# Patient Record
Sex: MALE | Race: BLACK OR AFRICAN AMERICAN | NOT HISPANIC OR LATINO | Employment: OTHER | URBAN - METROPOLITAN AREA
[De-identification: names, ages, dates, MRNs, and addresses within clinical notes are randomized per-mention and may not be internally consistent; named-entity substitution may affect disease eponyms.]

---

## 2018-10-04 ENCOUNTER — OFFICE VISIT (OUTPATIENT)
Dept: FAMILY MEDICINE | Facility: HOSPITAL | Age: 63
End: 2018-10-04
Attending: FAMILY MEDICINE
Payer: MEDICAID

## 2018-10-04 VITALS
DIASTOLIC BLOOD PRESSURE: 71 MMHG | BODY MASS INDEX: 21.19 KG/M2 | WEIGHT: 124.13 LBS | HEART RATE: 74 BPM | HEIGHT: 64 IN | SYSTOLIC BLOOD PRESSURE: 106 MMHG

## 2018-10-04 DIAGNOSIS — G89.29 CHRONIC PAIN OF BOTH SHOULDERS: Primary | ICD-10-CM

## 2018-10-04 DIAGNOSIS — M25.511 CHRONIC PAIN OF BOTH SHOULDERS: Primary | ICD-10-CM

## 2018-10-04 DIAGNOSIS — M25.512 CHRONIC PAIN OF BOTH SHOULDERS: Primary | ICD-10-CM

## 2018-10-04 DIAGNOSIS — R29.898 SHOULDER WEAKNESS: ICD-10-CM

## 2018-10-04 DIAGNOSIS — Z23 NEED FOR PROPHYLACTIC VACCINATION AND INOCULATION AGAINST INFLUENZA: Primary | ICD-10-CM

## 2018-10-04 DIAGNOSIS — G62.9 NEUROPATHY: ICD-10-CM

## 2018-10-04 PROCEDURE — 90686 IIV4 VACC NO PRSV 0.5 ML IM: CPT

## 2018-10-04 PROCEDURE — 99204 OFFICE O/P NEW MOD 45 MIN: CPT | Performed by: STUDENT IN AN ORGANIZED HEALTH CARE EDUCATION/TRAINING PROGRAM
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
## 2020-07-17 ENCOUNTER — HOSPITAL ENCOUNTER (EMERGENCY)
Facility: HOSPITAL | Age: 65
Discharge: HOME | End: 2020-07-17
Attending: EMERGENCY MEDICINE
Payer: MEDICAID

## 2020-07-17 VITALS
WEIGHT: 120 LBS | TEMPERATURE: 99 F | HEIGHT: 64 IN | DIASTOLIC BLOOD PRESSURE: 86 MMHG | BODY MASS INDEX: 20.49 KG/M2 | OXYGEN SATURATION: 97 % | SYSTOLIC BLOOD PRESSURE: 126 MMHG | RESPIRATION RATE: 20 BRPM | HEART RATE: 94 BPM

## 2020-07-17 DIAGNOSIS — Z48.02: Primary | ICD-10-CM

## 2020-07-17 PROCEDURE — 99282 EMERGENCY DEPT VISIT SF MDM: CPT

## 2020-07-17 NOTE — ED NOTES
SHARIF Duffy at bedside to remove staples. Pt's had 4 staples placed in March and is now wanting to have them removed. Pt is AAOx4, stable, and without acute distress at this time.     APPEARANCE: Alert, oriented and in no acute distress.  CARDIAC: Normal rate and rhythm.  PERIPHERAL VASCULAR: peripheral pulses present. Normal cap refill. No edema. Warm to touch.    RESPIRATORY:Normal rate and effort. Respirations are equal and unlabored no obvious signs of distress.  GASTRO: soft, no tenderness, no abdominal distention.  MUSC: Full ROM. No bony tenderness or soft tissue tenderness. No obvious deformity.  SKIN: Skin is warm and dry, normal skin turgor, mucous membranes moist. Pt has 4 staples noted to L frontal side of head. No signs of infection present.  NEURO: 5/5 strength major flexors/extensors bilaterally. Sensory intact to light touch bilaterally. [place] coma scale: eyes open spontaneously-4, oriented & converses-5, obeys commands-6. No neurological abnormalities.   MENTAL STATUS: awake, alert and aware of environment.

## 2020-07-17 NOTE — ED PROVIDER NOTES
Encounter Date: 7/17/2020       History     Chief Complaint   Patient presents with    Suture / Staple Removal     pt is returning to have staples removed from his head. staples were placed in March.      Quan Gurrola, a 65 y.o. male .[facility]     He presents to the ED evaluation of staple removal to left side of scalp that was placed in March.  Patient states he was reluctant to come to hospital or doctor's office because of the COVID pain to make.  He has no complaints at this time.          Review of patient's allergies indicates:  No Known Allergies  No past medical history on file.  No past surgical history on file.  No family history on file.  Social History     Tobacco Use    Smoking status: Current Every Day Smoker     Packs/day: 0.50     Types: Cigarettes   Substance Use Topics    Alcohol use: No     Frequency: Never     Binge frequency: Never    Drug use: Not on file     Review of Systems   Constitutional: Negative for fever.   Gastrointestinal: Negative for nausea and vomiting.   Musculoskeletal: Negative for neck pain and neck stiffness.   Skin: Positive for wound (healed laceration to left scalp ). Negative for color change.   Allergic/Immunologic: Negative for immunocompromised state.   Neurological: Negative for dizziness, weakness and numbness.   All other systems reviewed and are negative.      Physical Exam     Initial Vitals [07/17/20 1722]   BP Pulse Resp Temp SpO2   126/86 94 20 99.1 °F (37.3 °C) 97 %      MAP       --         Physical Exam    Nursing note and vitals reviewed.  Constitutional: He appears well-developed and well-nourished.   HENT:   Head: Normocephalic and atraumatic.       Right Ear: External ear normal.   Left Ear: External ear normal.   Nose: Nose normal.   Healed laceration to left scalp, 4 staples in place.   Eyes: EOM are normal.   Neck: Normal range of motion. Neck supple.   Cardiovascular: Normal rate and regular rhythm.   Pulmonary/Chest: No respiratory distress.    Musculoskeletal: Normal range of motion. No tenderness or edema.   Neurological: He is alert and oriented to person, place, and time.   Skin: Skin is warm and dry. Capillary refill takes less than 2 seconds.   Psychiatric: He has a normal mood and affect. Thought content normal.         ED Course   Suture Removal    Date/Time: 7/17/2020 6:12 PM  Location procedure was performed: [facility]  Performed by: Tati Berumen PA-C  Authorized by: Power Tamez MD   Body area: head/neck  Location details: scalp  Wound Appearance: clean, well healed, normal color, nontender and no drainage  Staples Removed: 4  Post-removal: no dressing applied  Complications: No  Specimens: No  Implants: No  Patient tolerance: Patient tolerated the procedure well with no immediate complications        Labs Reviewed - No data to display       Imaging Results    None          Medical Decision Making:   Initial Assessment:   Visit for staple removal  ED Management:  Patient presents to the ER for evaluation of staple removal.  Wound is well healed with no erythema or drainage.  Four staples were removed with no consequence to the patient.  No further follow-up needed.                                 Clinical Impression:       ICD-10-CM ICD-9-CM   1. Encounter for removal of staples  Z48.02 V58.32             ED Disposition Condition    Discharge Stable        ED Prescriptions     None        Follow-up Information    None                                    Tati Berumen PA-C  07/17/20 1811

## 2022-02-23 ENCOUNTER — TELEPHONE (OUTPATIENT)
Dept: HEMATOLOGY/ONCOLOGY | Facility: CLINIC | Age: 67
End: 2022-02-23
Payer: MEDICAID

## 2022-02-23 NOTE — TELEPHONE ENCOUNTER
----- Message from Iman Her sent at 2/23/2022  2:06 PM CST -----  Please see new referral in Media for bentley simpson

## 2023-07-14 ENCOUNTER — TELEPHONE (OUTPATIENT)
Dept: PULMONOLOGY | Facility: CLINIC | Age: 68
End: 2023-07-14
Payer: MEDICARE

## 2023-07-14 NOTE — TELEPHONE ENCOUNTER
----- Message from Anuja Simeon sent at 7/14/2023 11:01 AM CDT -----  Regarding: appt  Contact: [phone]  Pt requesting a appointment for the following possible lung cancer mass  ...Please call and adv @ [phone]

## 2023-07-14 NOTE — TELEPHONE ENCOUNTER
I called Mr Gurrola and asked does he have a ct disc copy from [facility] that was done 7-12-23?. He does not and I told him he will have to get a copy to bring to his appointment. He said he does not have time today to do this. I asked if I should call him Monday and see if he has it and he agreed. Ying Kaur

## 2023-08-04 ENCOUNTER — TELEPHONE (OUTPATIENT)
Dept: HEMATOLOGY/ONCOLOGY | Facility: CLINIC | Age: 68
End: 2023-08-04
Payer: MEDICARE

## 2023-08-04 NOTE — TELEPHONE ENCOUNTER
Referral received.  Attempted to contact Mr. Gurrola.  Left a detailed message and included my call back information. Will try again.

## 2023-08-08 ENCOUNTER — TELEPHONE (OUTPATIENT)
Dept: PULMONOLOGY | Facility: CLINIC | Age: 68
End: 2023-08-08
Payer: MEDICARE

## 2023-08-08 NOTE — TELEPHONE ENCOUNTER
Unable to leave message for sister @ [phone]. Unable to contact patient @ [phone], called home number and spoke with pt, number left for pt to contact Navigator to schedule appointment.  Patient not sure where imaging was done.  Called sister number again and spoke with her concerning pulmonary appointment.  Sister was able to tell where the imaging was done, and their availability for transportation.  Informed that appointment may be next Wednesday if we get images before the date of 8/16.  Verbalized understanding.   Called [facility] spoke with , imaging done at diagnostic imaging service in [place].  Information given to Thoracic Navigator (Christine Valencia) to assist getting images.

## 2023-08-10 ENCOUNTER — TELEPHONE (OUTPATIENT)
Dept: PULMONOLOGY | Facility: CLINIC | Age: 68
End: 2023-08-10
Payer: MEDICARE

## 2023-08-10 NOTE — TELEPHONE ENCOUNTER
Called and spoke with pt's sister as indicated on chart, confirmed scheduling of pulmonary appointment with Dr. Browne on Wednesday August 16th.  Instructions given on where to go with verbalized understanding.  Images requested through Oncology access navigator Bessy Her.

## 2023-08-15 ENCOUNTER — TELEPHONE (OUTPATIENT)
Dept: PULMONOLOGY | Facility: CLINIC | Age: 68
End: 2023-08-15
Payer: MEDICARE

## 2023-08-15 NOTE — TELEPHONE ENCOUNTER
Called and spoke with sister to confirm re-scheduling of pulmonary appointment from tomorrow 8/16 to Monday 8/218.  Verbalized understanding.

## 2023-08-28 ENCOUNTER — TELEPHONE (OUTPATIENT)
Dept: PULMONOLOGY | Facility: CLINIC | Age: 68
End: 2023-08-28
Payer: MEDICARE

## 2023-08-28 NOTE — TELEPHONE ENCOUNTER
Left message on pt voicemail, informing him that I have received his message. I advised pt that if he has any questions or concerns, he may contact the office.

## 2023-08-28 NOTE — TELEPHONE ENCOUNTER
Left message on pt voicemail, informing him that I have received his message. I also advised pt that if he has any questions or concerns, he may contact the office. Office number has been provided.

## 2023-08-28 NOTE — TELEPHONE ENCOUNTER
----- Message from Ladonna Ribera sent at 8/28/2023 12:37 PM CDT -----  Regarding: Reschedule Appt  Contact: Pt @ [phone] or [phone]  Pt is calling to get rescheduled, asking for a call back
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
## 2023-08-31 ENCOUNTER — TELEPHONE (OUTPATIENT)
Dept: PULMONOLOGY | Facility: CLINIC | Age: 68
End: 2023-08-31
Payer: MEDICARE

## 2023-08-31 NOTE — TELEPHONE ENCOUNTER
Called and spoke with pt to reschedule No Show appointment on 8/28, pt states that until they get transportation he can't make the appointment.  Stressed the importance of following up on his CT scan.  Verbalized understanding and nurse offered to call again to reschedule. Additional message left on  [phone].

## 2023-09-15 ENCOUNTER — TELEPHONE (OUTPATIENT)
Dept: PULMONOLOGY | Facility: CLINIC | Age: 68
End: 2023-09-15
Payer: MEDICARE

## 2023-09-15 NOTE — TELEPHONE ENCOUNTER
----- Message from Ladonna Ribera sent at 9/15/2023 10:01 AM CDT -----  Regarding: Appt  Contact: Pt @ [phone]  Pt is calling to get appt. Asking for a call back

## 2023-09-27 ENCOUNTER — OFFICE VISIT (OUTPATIENT)
Dept: PULMONOLOGY | Facility: CLINIC | Age: 68
End: 2023-09-27
Payer: MEDICARE

## 2023-09-27 VITALS
SYSTOLIC BLOOD PRESSURE: 109 MMHG | HEART RATE: 106 BPM | OXYGEN SATURATION: 93 % | HEIGHT: 64 IN | WEIGHT: 115.06 LBS | DIASTOLIC BLOOD PRESSURE: 62 MMHG | BODY MASS INDEX: 19.64 KG/M2

## 2023-09-27 DIAGNOSIS — R91.1 SOLITARY PULMONARY NODULE: Primary | ICD-10-CM

## 2023-09-27 PROCEDURE — 99999 PR PBB SHADOW E&M-EST. PATIENT-LVL IV: CPT | Mod: PBBFAC,,, | Performed by: EMERGENCY MEDICINE

## 2023-09-27 PROCEDURE — 3074F PR MOST RECENT SYSTOLIC BLOOD PRESSURE < 130 MM HG: ICD-10-PCS | Mod: CPTII,S$GLB,, | Performed by: EMERGENCY MEDICINE

## 2023-09-27 PROCEDURE — 3008F PR BODY MASS INDEX (BMI) DOCUMENTED: ICD-10-PCS | Mod: CPTII,S$GLB,, | Performed by: EMERGENCY MEDICINE

## 2023-09-27 PROCEDURE — 1101F PT FALLS ASSESS-DOCD LE1/YR: CPT | Mod: CPTII,S$GLB,, | Performed by: EMERGENCY MEDICINE

## 2023-09-27 PROCEDURE — 1126F AMNT PAIN NOTED NONE PRSNT: CPT | Mod: CPTII,S$GLB,, | Performed by: EMERGENCY MEDICINE

## 2023-09-27 PROCEDURE — 99999 PR PBB SHADOW E&M-EST. PATIENT-LVL IV: ICD-10-PCS | Mod: PBBFAC,,, | Performed by: EMERGENCY MEDICINE

## 2023-09-27 PROCEDURE — 99204 PR OFFICE/OUTPT VISIT, NEW, LEVL IV, 45-59 MIN: ICD-10-PCS | Mod: S$GLB,,, | Performed by: EMERGENCY MEDICINE

## 2023-09-27 PROCEDURE — 3288F PR FALLS RISK ASSESSMENT DOCUMENTED: ICD-10-PCS | Mod: CPTII,S$GLB,, | Performed by: EMERGENCY MEDICINE

## 2023-09-27 PROCEDURE — 3074F SYST BP LT 130 MM HG: CPT | Mod: CPTII,S$GLB,, | Performed by: EMERGENCY MEDICINE

## 2023-09-27 PROCEDURE — 1126F PR PAIN SEVERITY QUANTIFIED, NO PAIN PRESENT: ICD-10-PCS | Mod: CPTII,S$GLB,, | Performed by: EMERGENCY MEDICINE

## 2023-09-27 PROCEDURE — 3008F BODY MASS INDEX DOCD: CPT | Mod: CPTII,S$GLB,, | Performed by: EMERGENCY MEDICINE

## 2023-09-27 PROCEDURE — 1159F PR MEDICATION LIST DOCUMENTED IN MEDICAL RECORD: ICD-10-PCS | Mod: CPTII,S$GLB,, | Performed by: EMERGENCY MEDICINE

## 2023-09-27 PROCEDURE — 3078F DIAST BP <80 MM HG: CPT | Mod: CPTII,S$GLB,, | Performed by: EMERGENCY MEDICINE

## 2023-09-27 PROCEDURE — 99204 OFFICE O/P NEW MOD 45 MIN: CPT | Mod: S$GLB,,, | Performed by: EMERGENCY MEDICINE

## 2023-09-27 PROCEDURE — 1160F RVW MEDS BY RX/DR IN RCRD: CPT | Mod: CPTII,S$GLB,, | Performed by: EMERGENCY MEDICINE

## 2023-09-27 PROCEDURE — 1101F PR PT FALLS ASSESS DOC 0-1 FALLS W/OUT INJ PAST YR: ICD-10-PCS | Mod: CPTII,S$GLB,, | Performed by: EMERGENCY MEDICINE

## 2023-09-27 PROCEDURE — 3288F FALL RISK ASSESSMENT DOCD: CPT | Mod: CPTII,S$GLB,, | Performed by: EMERGENCY MEDICINE

## 2023-09-27 PROCEDURE — 3078F PR MOST RECENT DIASTOLIC BLOOD PRESSURE < 80 MM HG: ICD-10-PCS | Mod: CPTII,S$GLB,, | Performed by: EMERGENCY MEDICINE

## 2023-09-27 PROCEDURE — 1160F PR REVIEW ALL MEDS BY PRESCRIBER/CLIN PHARMACIST DOCUMENTED: ICD-10-PCS | Mod: CPTII,S$GLB,, | Performed by: EMERGENCY MEDICINE

## 2023-09-27 PROCEDURE — 1159F MED LIST DOCD IN RCRD: CPT | Mod: CPTII,S$GLB,, | Performed by: EMERGENCY MEDICINE

## 2023-09-27 RX ORDER — ALBUTEROL SULFATE 90 UG/1
AEROSOL, METERED RESPIRATORY (INHALATION)
COMMUNITY
Start: 2023-07-14

## 2023-09-27 RX ORDER — LACTULOSE 10 G/15ML
SOLUTION ORAL; RECTAL
COMMUNITY
Start: 2023-04-28

## 2023-09-27 RX ORDER — LOVASTATIN 10 MG/1
10 TABLET ORAL
COMMUNITY
Start: 2023-07-14

## 2023-09-27 NOTE — PROGRESS NOTES
"Pulmonary & Critical Care Medicine   Consultation Note    Reason for Consultation:    HPI: 69 y/o male with history as below. Had cough, CXr per primary MD at [facility]- Followed with CT Chest and found to have RUL opacity.      PmHx COPD, HLD  Dyspnea up and down steps. Improves with inhaler..   No edema.   No F/C/NS or additional symptoms.   Weight stable.   Unsure of inhaler therapy- Record review looks like albuterol.   Retired.   Denies HF/CVA/CAD   No echo on file. Nothing in care everywhere.     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 [place]. Same home 4 years. No renovations/construction..   Exposure to Animals/Pets: None   Travel History: None   History of exposures to TB: None   Family History of Lung Cancer: None   Childhood history of Lung Disease:None   Chest surgery/Trauma- None   OAC/Anti PLT- None   Tobacco history- Still off and on with smokes.. Started smoking age 4.. 1 pk/day   Anesthesia History- Never had.       Social History:   Social History     Socioeconomic History    Marital status: Single   Tobacco Use    Smoking status: Every Day     Current packs/day: 0.50     Types: Cigarettes   Substance and Sexual Activity    Alcohol use: No     No family history on file.  Drug Allergies:   Review of patient's allergies indicates:  No Known Allergies      Review of Systems:   A comprehensive 12-point review of systems was performed, and is negative except for those items mentioned above in the HPI section of this note.     Vital Signs:  /62 (BP Location: Left arm, Patient Position: Sitting, BP Method: Medium (Manual))   Pulse 106   Ht 5' 4" (1.626 m)   Wt 52.2 kg (115 lb 1.3 oz)   SpO2 (!) 93%   BMI 19.75 kg/m²      Physical Exam:   GEN- NAD AAOx3 Well Built, Well Appearing   HEENT- ATNC, PERRLA, EOMI, OP-Cl. No JVD, LAD or bruit noted. Trachea " Midline.   CV- RRR No M/R/G  RESP- CTA-Bilateral   GI- S/NT/ND. Positive BS X 4. No HSM Noted  BACK- Spine midline. No step off, crepitus or deformity noted. No midline TTP.   Ext- MAEW, No deformity. No edema or rashes noted.   Neuro- Strength 5/5 symmetric. CN 2-12 intact. Normal gait. Normal sensation.      Personal Review and Summary of Prior Diagnostics    Laboratory Studies: Reviewed   No Labs in system since 2020.     Microbiology Data: Reviewed     Summary of Chest Imaging Personally Reviewed:   CT [facility]-   RUL Nodule with band like scaring.   RLL sub centimeter nodule- Adjacent pleural thickening.   No Old imaging for comparison           2D Echo: None     PFT's: None          Assessment:     No diagnosis found.     No outpatient encounter medications on file as of 9/27/2023.     No facility-administered encounter medications on file as of 9/27/2023.     No orders of the defined types were placed in this encounter.      Plan:     Pulmonary nodule- Spiculated RUL + sub centimeter pleural based nodule on right with adjacent pleural thickening. No old imaging for comparison.   Tobacco use- Continued use. Discussed cessation. Has cut back considerably. Started smoking at age 4!   COPD- Suspected. On prn albuterol. Possible controller therapy but unsure. Reports PFTs remotely.. Unable to view.       -- Plan for robotic bronch + EBUS- Case request placed 10/27   -- Check NM PET   -- Needs repeat CT for planning. Ordered   -- Walk + PFTs   -- Maintain prn albuterol   -- No OAC/Anti_PLT   -- Smoking cessation as above.     Discussed procedure in detail. Risks including bleeding, respiratory failure, PTX and numerous additional potential complication up to death outlined. He verbalized understanding and all questions answered to his satisfaction. Written consent signed and on chart.       Rodrigo Navas MD   [facility]

## 2023-10-25 ENCOUNTER — HOSPITAL ENCOUNTER (OUTPATIENT)
Dept: PULMONOLOGY | Facility: CLINIC | Age: 68
Discharge: HOME | End: 2023-10-25
Payer: MEDICARE

## 2023-10-25 ENCOUNTER — HOSPITAL ENCOUNTER (OUTPATIENT)
Dept: RADIOLOGY | Facility: HOSPITAL | Age: 68
Discharge: HOME | End: 2023-10-25
Attending: EMERGENCY MEDICINE
Payer: MEDICARE

## 2023-10-25 VITALS — HEIGHT: 66 IN | WEIGHT: 114.19 LBS | BODY MASS INDEX: 18.35 KG/M2

## 2023-10-25 DIAGNOSIS — R91.1 SOLITARY PULMONARY NODULE: ICD-10-CM

## 2023-10-25 LAB
DLCO SINGLE BREATH LLN: 17.92
DLCO SINGLE BREATH PRE REF: 33.9 %
DLCO SINGLE BREATH REF: 24.85
DLCOC SBVA LLN: 2.55
DLCOC SBVA REF: 3.81
DLCOC SINGLE BREATH LLN: 17.92
DLCOC SINGLE BREATH REF: 24.85
DLCOCSBVAULN: 5.07
DLCOCSINGLEBREATHULN: 31.78
DLCOSINGLEBREATHULN: 31.78
DLCOVA LLN: 2.55
DLCOVA PRE REF: 51.6 %
DLCOVA PRE: 1.97 ML/(MIN*MMHG*L) (ref 2.55–5.07)
DLCOVA REF: 3.81
DLCOVAULN: 5.07
ERV LLN: -16449
ERV PRE REF: 108.5 %
ERV REF: 1
ERVULN: ABNORMAL
FEF 25 75 LLN: 0.72
FEF 25 75 PRE REF: 32.3 %
FEF 25 75 REF: 1.99
FET100 CHG: 21.4 %
FEV05 LLN: 1.24
FEV05 REF: 2.38
FEV1 CHG: -6.3 %
FEV1 FVC LLN: 64
FEV1 FVC PRE REF: 73.5 %
FEV1 FVC REF: 77
FEV1 LLN: 1.72
FEV1 PRE REF: 58.9 %
FEV1 REF: 2.5
FEV1 VOL CHG: -0.09
FRCPLETH LLN: 2.53
FRCPLETH PREREF: 98.9 %
FRCPLETH REF: 3.51
FRCPLETHULN: 4.5
FVC CHG: -3.3 %
FVC LLN: 2.34
FVC PRE REF: 80.1 %
FVC REF: 3.24
FVC VOL CHG: -0.09
IVC PRE: 2.53 L (ref 2.34–4.16)
IVC SINGLE BREATH LLN: 2.34
IVC SINGLE BREATH PRE REF: 78 %
IVC SINGLE BREATH REF: 3.24
IVCSINGLEBREATHULN: 4.16
LLN IC: -9999997.2
PEF LLN: 4.89
PEF PRE REF: 51.3 %
PEF REF: 7.33
PHYSICIAN COMMENT: ABNORMAL
POCT GLUCOSE: 87 MG/DL (ref 70–110)
POST FEF 25 75: 0.55 L/S (ref 0.72–3.9)
POST FET 100: 8.19 SEC
POST FEV1 FVC: 54.98 % (ref 64.17–88.72)
POST FEV1: 1.38 L (ref 1.72–3.22)
POST FEV5: 0.99 L (ref 1.24–3.51)
POST FVC: 2.51 L (ref 2.34–4.16)
POST PEF: 2.91 L/S (ref 4.89–9.76)
PRE DLCO: 8.41 ML/(MIN*MMHG) (ref 17.92–31.78)
PRE ERV: 1.08 L (ref -16449–16451)
PRE FEF 25 75: 0.64 L/S (ref 0.72–3.9)
PRE FET 100: 6.74 SEC
PRE FEV05 REF: 43.4 %
PRE FEV1 FVC: 56.72 % (ref 64.17–88.72)
PRE FEV1: 1.47 L (ref 1.72–3.22)
PRE FEV5: 1.03 L (ref 1.24–3.51)
PRE FRC PL: 3.47 L (ref 2.53–4.5)
PRE FVC: 2.6 L (ref 2.34–4.16)
PRE IC: 1.52 L (ref -9999997.2–#######.####)
PRE PEF: 3.76 L/S (ref 4.89–9.76)
PRE REF IC: 54.2 %
PRE RV: 2.39 L (ref 1.84–3.19)
PRE TLC: 4.99 L (ref 5.37–7.67)
RAW PRE REF: 94.8 %
RAW PRE: 2.9 CMH2O*S/L (ref 3.06–3.06)
RAW REF: 3.06
REF IC: 2.8
RV LLN: 1.84
RV PRE REF: 95 %
RV REF: 2.52
RVTLC LLN: 32
RVTLC PRE REF: 117.3 %
RVTLC PRE: 47.92 % (ref 31.89–49.85)
RVTLC REF: 41
RVTLCULN: 50
RVULN: 3.19
SGAW PRE REF: 108.2 %
SGAW PRE: 0.09 1/(CMH2O*S) (ref 0.08–0.08)
SGAW REF: 0.08
TLC LLN: 5.37
TLC PRE REF: 76.6 %
TLC REF: 6.52
TLC ULN: 7.67
ULN IC: ABNORMAL
VA PRE: 4.27 L (ref 6.37–6.37)
VA SINGLE BREATH LLN: 6.37
VA SINGLE BREATH PRE REF: 67.1 %
VA SINGLE BREATH REF: 6.37
VASINGLEBREATHULN: 6.37
VC LLN: 2.34
VC PRE REF: 80.2 %
VC PRE: 2.6 L (ref 2.34–4.16)
VC REF: 3.24
VC ULN: 4.16

## 2023-10-25 PROCEDURE — 78815 PET IMAGE W/CT SKULL-THIGH: CPT | Mod: 26,PI,, | Performed by: STUDENT IN AN ORGANIZED HEALTH CARE EDUCATION/TRAINING PROGRAM

## 2023-10-25 PROCEDURE — 94729 DIFFUSING CAPACITY: CPT | Mod: S$GLB,,, | Performed by: INTERNAL MEDICINE

## 2023-10-25 PROCEDURE — 94729 PR C02/MEMBANE DIFFUSE CAPACITY: ICD-10-PCS | Mod: S$GLB,,, | Performed by: INTERNAL MEDICINE

## 2023-10-25 PROCEDURE — 94618 PULMONARY STRESS TESTING: CPT | Mod: S$GLB,,, | Performed by: INTERNAL MEDICINE

## 2023-10-25 PROCEDURE — 94726 PULM FUNCT TST PLETHYSMOGRAP: ICD-10-PCS | Mod: S$GLB,,, | Performed by: INTERNAL MEDICINE

## 2023-10-25 PROCEDURE — A9552 F18 FDG: HCPCS

## 2023-10-25 PROCEDURE — 71250 CT THORAX DX C-: CPT | Mod: TC

## 2023-10-25 PROCEDURE — 94060 PR EVAL OF BRONCHOSPASM: ICD-10-PCS | Mod: 59,S$GLB,, | Performed by: INTERNAL MEDICINE

## 2023-10-25 PROCEDURE — 71250 CT THORAX DX C-: CPT | Mod: 26,,, | Performed by: RADIOLOGY

## 2023-10-25 PROCEDURE — 94618 PULMONARY STRESS TESTING: ICD-10-PCS | Mod: S$GLB,,, | Performed by: INTERNAL MEDICINE

## 2023-10-25 PROCEDURE — 71250 CT CHEST WITHOUT CONTRAST: ICD-10-PCS | Mod: 26,,, | Performed by: RADIOLOGY

## 2023-10-25 PROCEDURE — 94060 EVALUATION OF WHEEZING: CPT | Mod: 59,S$GLB,, | Performed by: INTERNAL MEDICINE

## 2023-10-25 PROCEDURE — 94726 PLETHYSMOGRAPHY LUNG VOLUMES: CPT | Mod: S$GLB,,, | Performed by: INTERNAL MEDICINE

## 2023-10-25 PROCEDURE — 78815 NM PET CT ROUTINE: ICD-10-PCS | Mod: 26,PI,, | Performed by: STUDENT IN AN ORGANIZED HEALTH CARE EDUCATION/TRAINING PROGRAM

## 2023-10-25 NOTE — PROCEDURES
Quan Gurrola is a 68 y.o.  male patient, who presents for a 6 minute walk test ordered by MD Eloise.  The diagnosis is COPD; Abnormal Chest Radiography.  The patient's BMI is 18.4 kg/m2.  Predicted distance (lower limit of normal) is 404.92 meters.      Test Results:    The test was completed without stopping. The total time walked was 360 seconds. During walking, the patient reported:  No complaints. The patient used no assistive devices during testing.     10/25/2023---------Distance: 396.24 meters (1300 feet)     O2 Sat % Supplemental Oxygen Heart Rate Blood Pressure Spring Scale   Pre-exercise  (Resting) 94 % Room Air 97 bpm 105/70 mmHg 1   During Exercise 90 % Room Air 112 bpm 133/79 mmHg 2   Post-exercise  (Recovery) 94 % Room Air  96 bpm 118/66 mmHg      Recovery Time: 128 seconds    Performing nurse/tech: Evelia PETERSON      PREVIOUS STUDY:   The patient has not had a previous study.      CLINICAL INTERPRETATION:  Six minute walk distance is 396.24 meters (1300 feet) with light dyspnea.  During exercise, there was desaturation while breathing room air.  Both blood pressure and heart rate remained stable with walking.  The patient did not report non-pulmonary symptoms during exercise.  No previous study performed.  Based upon age and body mass index, exercise capacity is less than predicted.

## 2023-10-27 ENCOUNTER — DOCUMENTATION ONLY (OUTPATIENT)
Dept: PULMONOLOGY | Facility: CLINIC | Age: 68
End: 2023-10-27
Payer: MEDICARE

## 2023-10-27 ENCOUNTER — TELEPHONE (OUTPATIENT)
Dept: PULMONOLOGY | Facility: CLINIC | Age: 68
End: 2023-10-27
Payer: MEDICARE

## 2023-10-27 NOTE — TELEPHONE ENCOUNTER
Left message on patient voicemail, informing him that I'm contacting him in regards to him completing Bronchoscopy procedure with Dr Navas. I also advised pt that we can schedule his procedure on 11/21/23 or 11/24/23. I also advised pt that if he wishes to reschedule procedure or if he has any questions or concerns, he may contact the office. Office number has been provided.

## 2023-10-27 NOTE — TELEPHONE ENCOUNTER
----- Message from Rodrigo Navas MD sent at 10/27/2023  1:46 PM CDT -----  This patient did not show for scheduled robotic bronchoscopy on 10/27.   Can we reach out to him to reschedule. Next available will be 11/21 or 11/24. Let me know what day he chooses and I will place case request.     Thanks.   DV

## 2023-10-27 NOTE — PROGRESS NOTES
Patient did not show for planned bronchoscopy on 10/27 as scheduled. Multiple attempts to reach patient by myself and OR staff without answer. Will have MA contact to reschedule.     Rodrigo Navas MD

## 2023-12-29 ENCOUNTER — TELEPHONE (OUTPATIENT)
Dept: PULMONOLOGY | Facility: CLINIC | Age: 68
End: 2023-12-29
Payer: MEDICARE

## 2023-12-29 NOTE — TELEPHONE ENCOUNTER
Call was returned to patient sister Ms Nathan. She stated her brother need an appointment and she'll like to speak with Dr Navas assistant. I informed her that Dr Eloise NEVES is not in clinic today but I will send her a message. She verbalized understanding.

## 2023-12-29 NOTE — TELEPHONE ENCOUNTER
----- Message from Adeola June sent at 12/29/2023 11:11 AM CST -----  Regarding: appt access  Contact: [phone]  Pt sister Jac is calling to speak with someone in provider office in regards to getting pt schedule for a f/u appt with this provider I tried scheduling appt no appt dated popped up Jac is asking for a return call please call her at  [phone]

## 2024-01-02 ENCOUNTER — TELEPHONE (OUTPATIENT)
Dept: PULMONOLOGY | Facility: CLINIC | Age: 69
End: 2024-01-02
Payer: MEDICARE

## 2024-01-02 NOTE — TELEPHONE ENCOUNTER
Spoke with pt sister (Ms Jovani Dyer). Pt sister states that pt was suppose to complete a Bronch Procedure with Dr Navas on 10/27/23 but pt did not complete the procedure and patient will like to reschedule his procedure. I verbalized to pt sister that I understand and advised her that I will forward her message to Dr Navas to review/advise in regards to if he wants pt to complete another Ct Scan prior. Pt sister verbalized that she understand.

## 2024-01-02 NOTE — TELEPHONE ENCOUNTER
Spoke with patient, informed him that I'm returning a telephone call to his sister whom states that she will like to speak with me. Pt verbalized that  he understand and states that his sister has just left his house and that I can reach her on the [phone] telephone number that is in his folder.    Left message on sister voicemail, informing her that I'm returning her call if she has any questions or concerns, she may contact the office.  
DISPLAY PLAN FREE TEXT

## 2024-01-03 ENCOUNTER — TELEPHONE (OUTPATIENT)
Dept: PULMONOLOGY | Facility: CLINIC | Age: 69
End: 2024-01-03
Payer: MEDICARE

## 2024-01-03 NOTE — TELEPHONE ENCOUNTER
Attempted to contact pt in regards to scheduling his appointment with Dr Navas in clinic but  picked up on both numbers stating that my call can not be received at this time and for me to hang up and try my call again.

## 2024-01-04 ENCOUNTER — TELEPHONE (OUTPATIENT)
Dept: GASTROENTEROLOGY | Facility: CLINIC | Age: 69
End: 2024-01-04
Payer: MEDICARE

## 2024-01-04 NOTE — TELEPHONE ENCOUNTER
Spoke with Libia. She stated they wanted to get the pt scheduled for a visit with an MD. Scheduled pt with Dr. Cuenca for 1/19/24 at 1 pm. Sent Neftaly message to override.

## 2024-01-10 ENCOUNTER — TELEPHONE (OUTPATIENT)
Dept: PULMONOLOGY | Facility: CLINIC | Age: 69
End: 2024-01-10
Payer: MEDICARE

## 2024-01-10 NOTE — TELEPHONE ENCOUNTER
----- Message from Morelia Arnold sent at 1/10/2024  4:16 PM CST -----  Regarding: [phone]  Type:  Sooner Appointment Request    Patient is requesting a sooner appointment.  Patient declined first available appointment listed as well as another facility and provider .  Patient will not accept being placed on the waitlist and is requesting a message be sent to doctor.    Name of Caller: self     When is the first available appointment? No availability       Symptoms: follow up     Would the patient rather a call back or a response via [facility]? Call back     Best Call Back Number: [phone] or [phone]      Additional Information: will like a call back to get scheduled with a sooner appt.

## 2024-01-10 NOTE — TELEPHONE ENCOUNTER
Spoke with pt, informed him that I have received his message. Pt states that he is not to sure what department he needs to schedule appointment with and that he will contact the office once his sister comes back. I verbalized to pt that I understand.

## 2024-01-19 ENCOUNTER — OFFICE VISIT (OUTPATIENT)
Dept: GASTROENTEROLOGY | Facility: CLINIC | Age: 69
End: 2024-01-19
Payer: MEDICARE

## 2024-01-19 VITALS — WEIGHT: 114 LBS | BODY MASS INDEX: 18.32 KG/M2 | HEIGHT: 66 IN

## 2024-01-19 DIAGNOSIS — Z12.11 COLON CANCER SCREENING: ICD-10-CM

## 2024-01-19 DIAGNOSIS — K59.00 CONSTIPATION, UNSPECIFIED CONSTIPATION TYPE: Primary | ICD-10-CM

## 2024-01-19 PROCEDURE — 3008F BODY MASS INDEX DOCD: CPT | Mod: CPTII,S$GLB,, | Performed by: INTERNAL MEDICINE

## 2024-01-19 PROCEDURE — 1159F MED LIST DOCD IN RCRD: CPT | Mod: CPTII,S$GLB,, | Performed by: INTERNAL MEDICINE

## 2024-01-19 PROCEDURE — 99204 OFFICE O/P NEW MOD 45 MIN: CPT | Mod: S$GLB,,, | Performed by: INTERNAL MEDICINE

## 2024-01-19 PROCEDURE — 1126F AMNT PAIN NOTED NONE PRSNT: CPT | Mod: CPTII,S$GLB,, | Performed by: INTERNAL MEDICINE

## 2024-01-19 PROCEDURE — 1101F PT FALLS ASSESS-DOCD LE1/YR: CPT | Mod: CPTII,S$GLB,, | Performed by: INTERNAL MEDICINE

## 2024-01-19 PROCEDURE — 3288F FALL RISK ASSESSMENT DOCD: CPT | Mod: CPTII,S$GLB,, | Performed by: INTERNAL MEDICINE

## 2024-01-19 PROCEDURE — 99999 PR PBB SHADOW E&M-EST. PATIENT-LVL III: CPT | Mod: PBBFAC,,, | Performed by: INTERNAL MEDICINE

## 2024-01-19 RX ORDER — UMECLIDINIUM BROMIDE AND VILANTEROL TRIFENATATE 62.5; 25 UG/1; UG/1
1 POWDER RESPIRATORY (INHALATION)
COMMUNITY
Start: 2023-12-27

## 2024-01-19 RX ORDER — SODIUM, POTASSIUM,MAG SULFATES 17.5-3.13G
1 SOLUTION, RECONSTITUTED, ORAL ORAL DAILY
Qty: 1 KIT | Refills: 0 | Status: SHIPPED | OUTPATIENT
Start: 2024-01-19 | End: 2024-01-21

## 2024-01-19 RX ORDER — NAPROXEN SODIUM 220 MG/1
81 TABLET, FILM COATED ORAL DAILY
COMMUNITY

## 2024-01-19 NOTE — H&P (VIEW-ONLY)
Subjective:       Patient ID: Quan Gurrola is a 68 y.o. male.    Chief Complaint: Constipation    Patient here today to establish care with aforementioned complaints.  He apparently was referred for evaluation of constipation.  He has been prescribed lactulose which seems to help.  He is currently having 1-2 bowel movements today without significant straining.  Denies blood in stool or cramping since starting medication.  His weight fluctuates however he does have pulmonary observation which is being evaluated.  He denies prior colonoscopy.    No past medical history on file.    No past surgical history on file.    Social History  Social History     Tobacco Use    Smoking status: Every Day     Current packs/day: 0.50     Types: Cigarettes   Substance Use Topics    Alcohol use: No       No family history on file.    Review of Systems   Constitutional:  Positive for unexpected weight change. Negative for appetite change.   Gastrointestinal:  Positive for constipation. Negative for abdominal distention and abdominal pain.           Objective:      Physical Exam  Constitutional:       Appearance: He is well-developed.   HENT:      Head: Normocephalic and atraumatic.   Eyes:      Conjunctiva/sclera: Conjunctivae normal.   Pulmonary:      Effort: Pulmonary effort is normal. No respiratory distress.   Neurological:      Mental Status: He is alert and oriented to person, place, and time.   Psychiatric:         Behavior: Behavior normal.         Thought Content: Thought content normal.         Judgment: Judgment normal.           Pertinent labs and imaging studies reviewed    Assessment:       1. Constipation, unspecified constipation type    2. Colon cancer screening        Plan:       Okay to continue lactulose   Schedule screening colonoscopy    (Portions of this note were dictated using voice recognition software and may contain dictation related errors in spelling/grammar/syntax not found on text review)

## 2024-01-19 NOTE — PATIENT INSTRUCTIONS
SUPREP Instructions    [facility] [address]  Clinic Office [phone]  Endoscopy Lab [phone]    You are scheduled for a Colonoscopy with Dr. Cuenca  on 02/14/2024 at [facility] in [place].    Check in at the Hospital -1st floor, Information desk.   Call [phone] to reschedule.    An adult friend/family member must come with you to drive you home.  You cannot drive, take a taxi, Uber/Lyft or bus to leave the Endoscopy Center alone.  If you do not have someone to drive you home, your test will be cancelled.     Please follow the directions of your doctor if you take any pills that thin your blood. If you take these meds: Aggrenox, Brilinta, Effient, Eliquis, Lovenox, Plavix, Pletal, Pradaxa, Ticilid, Xarelto or Coumadin, let the doctor's office know.    Please hold any GLP-1 medications prior to the procedure: Dulaglutide Trulicity(hold week prior), Exenatide Byetta (hold the morning of procedure), Semaglutide Ozempic (hold week prior), Liraglutide Victoza, Saxenda(hold week prior), Lixisenatide Adlyxin (hold the morning of procedure), Semaglutide Rybelsus (hold the morning of procedure), Tirzepatide Mounjaro (hold week prior)     DON'T: On the morning of the test do not take insulin or pills for diabetes.     DO: On the morning of the test, do take any pills for blood pressure, heart, anti-rejection and or seizures with a small sip of water. Bring any inhalers with you.    To have a good prep, you must follow these instructions - please do not use the directions from the pharmacy.    The doctor will send a prescription for the SUPREP.      The Day Before the test:    You can only drink CLEAR LIQUIDS the whole day before your test.  You can't eat any food for the whole day.    You CAN have:  Water, Coffee or decaf coffee (no milk or cream)  Tea  Soft drinks - regular and sugar free  Jello (green or yellow)  Apple Juice, white grape juice, white cranberry  juice  Gatorade, Power Aid, Crystal Light, Yoav Aid  Lemonade and Limeade  Bouillon, clear soup  Snowball, popsicles  YOU CAN'T DRINK ANYTHING RED, PURPLE ORANGE OR BLUE   YOU CAN'T DRINK ALCOHOL  ONLY DRINK WHAT IS ON THE LIST      At 5 pm the night before your test:    Pour the 1st bottle of SUPREP into the cup provided in the box. Add water to the line on the cup and mix well.  Drink the whole cup and then drink 2 more full cups of water over 1 hour.  This can be easier to drink if it is cold. You can mix it 20 minutes ahead of time and place in the refrigerator before you drink it.  You must drink it within 30-45 minutes of mixing it.  Do NOT pour the drink over ice.  You can drink it with a straw.    The Day of the test - We will call you 2 days before your test to tell you what time to get there.    5 hours before you come to the hospital (this may be in the middle of the night)  Pour the 2nd bottle of SUPREP into the cup provided in the box. Add water to the line on the cup and mix well.  Drink the whole cup and then drink 2 more full cups of water over 1 hour.  It might be easier to drink if it is cold. You can mix it 20 minutes ahead of time and place in the refrigerator before you drink it.  You must drink it within 30-45 minutes of mixing it.  Do NOT pour the drink over ice.  You can drink it with a straw.    YOU CAN'T EAT OR DRINK ANYTHING ELSE ONCE YOU FINISH THE PREP    Leave all valuables and jewelry at home. You will be at the hospital for 2-4 hours.    Call the Endoscopy department at [phone] with any questions about your procedure.

## 2024-01-23 ENCOUNTER — TELEPHONE (OUTPATIENT)
Dept: PULMONOLOGY | Facility: CLINIC | Age: 69
End: 2024-01-23
Payer: MEDICARE

## 2024-01-23 NOTE — TELEPHONE ENCOUNTER
----- Message from Anuja Simeon sent at 1/23/2024  3:12 PM CST -----  Regarding: lien surgery  Contact: @ [phone] [facility] is calling to help reschedule a surgery that he missed in 10/2023 please call and adv @ [phone] [place]

## 2024-01-23 NOTE — TELEPHONE ENCOUNTER
Left message with staff informing staff that I have received a message. Staff states that Ms Mariano is gone for the day but however, she will advise Ms Mariano to contact me in the morning. I verbalized to staff that I understand.

## 2024-01-24 ENCOUNTER — TELEPHONE (OUTPATIENT)
Dept: PULMONOLOGY | Facility: CLINIC | Age: 69
End: 2024-01-24
Payer: MEDICARE

## 2024-01-24 NOTE — TELEPHONE ENCOUNTER
Attempted to contact pt sister (Ms Nathan) but no one answered telephone and voicemail to full to leave pt sister a message.

## 2024-01-24 NOTE — TELEPHONE ENCOUNTER
----- Message from Eladio Winn sent at 1/24/2024  1:03 PM CST -----  Regarding: Returning missed call  Contact: [phone]  Patient sister is returning a missed called from Paola.  Please call to further discuss.

## 2024-02-09 ENCOUNTER — TELEPHONE (OUTPATIENT)
Dept: ENDOSCOPY | Facility: HOSPITAL | Age: 69
End: 2024-02-09
Payer: MEDICARE

## 2024-02-09 ENCOUNTER — TELEPHONE (OUTPATIENT)
Dept: PULMONOLOGY | Facility: CLINIC | Age: 69
End: 2024-02-09
Payer: MEDICARE

## 2024-02-09 NOTE — TELEPHONE ENCOUNTER
Attempted to contact patient in regards to scheduling his appointment with Dr Navas but no one answered pt telephone and no voicemail has picked up to leave pt a message.

## 2024-02-09 NOTE — TELEPHONE ENCOUNTER
----- Message from Christine Valencia RN sent at 2/9/2024  4:15 PM CST -----  Regarding: FW: Thoracic  Paola,    Looks like this patient needs follow-up with Pulmonary.    Christine  ----- Message -----  From: Jannette Dumont RN  Sent: 2/6/2024   4:02 PM CST  To: Christine Valencia RN  Subject: FW: Thoracic                                       ----- Message -----  From: Leesa Myers  Sent: 2/6/2024   3:52 PM CST  To: [facility] Navigation  Subject: Thoracic                                         New Cancer Patient Intake Documentation     Diagnosis: Lung Mass     Date patient referral received: 1/26/24     Records collected: / epic     Questions asked:  What doctor have you seen for this diagnosis?  Rodrigo Navas MD    What imaging have you had?   PET scan 10/25/23  CT Chest 10/25/23  CT 7/12/23     Have you been diagnosed with cancer by a biopsy and/or surgery?   Lilli Reza from [facility] is requesting patient be seen by Dr. George. She is also requesting nurse navigator to call her once patient is scheduled at [phone].

## 2024-02-09 NOTE — TELEPHONE ENCOUNTER
Left messages instructing patient to call dept @ [phone] between 8am-3pm.    Arrival time to be given @ 0715  Colon/Suprep   Inst in VS 01/19/24  (No  [facility])

## 2024-02-12 ENCOUNTER — TELEPHONE (OUTPATIENT)
Dept: ENDOSCOPY | Facility: HOSPITAL | Age: 69
End: 2024-02-12
Payer: MEDICARE

## 2024-02-12 NOTE — TELEPHONE ENCOUNTER
Spoke with patient about arrival time @ 0715.   Colon Suprep    Prep instructions reviewed: the day before the procedure, follow a clear liquid diet all day, then start the first 1/2 of prep at 5pm and take 2nd 1/2 of prep @ 0200.  Pt must be completely NPO when prep completed @ 0400.              Medications: Do not take Insulin or oral diabetic medications the day of the procedure.  Take as prescribed: heart, seizure and blood pressure medication in the morning with a sip of water (less than an ounce).  Take any breathing medications and bring inhalers to hospital with you Leave all valuables and jewelry at home.     Wear comfortable clothes to procedure to change into hospital gown You cannot drive for 24 hours after your procedure because you will receive sedation for your procedure to make you comfortable.  A ride must be provided at discharge.

## 2024-02-14 ENCOUNTER — HOSPITAL ENCOUNTER (OUTPATIENT)
Facility: HOSPITAL | Age: 69
Discharge: HOME | End: 2024-02-14
Attending: INTERNAL MEDICINE | Admitting: INTERNAL MEDICINE
Payer: MEDICARE

## 2024-02-14 ENCOUNTER — ANESTHESIA EVENT (OUTPATIENT)
Dept: ENDOSCOPY | Facility: HOSPITAL | Age: 69
End: 2024-02-14
Payer: MEDICARE

## 2024-02-14 ENCOUNTER — ANESTHESIA (OUTPATIENT)
Dept: ENDOSCOPY | Facility: HOSPITAL | Age: 69
End: 2024-02-14
Payer: MEDICARE

## 2024-02-14 VITALS
HEART RATE: 89 BPM | RESPIRATION RATE: 22 BRPM | OXYGEN SATURATION: 99 % | DIASTOLIC BLOOD PRESSURE: 64 MMHG | HEIGHT: 64 IN | WEIGHT: 109 LBS | SYSTOLIC BLOOD PRESSURE: 101 MMHG | BODY MASS INDEX: 18.61 KG/M2 | TEMPERATURE: 98 F

## 2024-02-14 DIAGNOSIS — Z12.11 COLON CANCER SCREENING: ICD-10-CM

## 2024-02-14 PROCEDURE — D9220A PRA ANESTHESIA: Mod: PT,CRNA,, | Performed by: NURSE ANESTHETIST, CERTIFIED REGISTERED

## 2024-02-14 PROCEDURE — 45385 COLONOSCOPY W/LESION REMOVAL: CPT | Mod: PT | Performed by: INTERNAL MEDICINE

## 2024-02-14 PROCEDURE — 27201089 HC SNARE, DISP (ANY): Performed by: INTERNAL MEDICINE

## 2024-02-14 PROCEDURE — 88305 TISSUE EXAM BY PATHOLOGIST: CPT | Mod: 26,,, | Performed by: STUDENT IN AN ORGANIZED HEALTH CARE EDUCATION/TRAINING PROGRAM

## 2024-02-14 PROCEDURE — 63600175 PHARM REV CODE 636 W HCPCS: Performed by: NURSE ANESTHETIST, CERTIFIED REGISTERED

## 2024-02-14 PROCEDURE — 88305 TISSUE EXAM BY PATHOLOGIST: CPT | Performed by: STUDENT IN AN ORGANIZED HEALTH CARE EDUCATION/TRAINING PROGRAM

## 2024-02-14 PROCEDURE — 25000003 PHARM REV CODE 250: Performed by: INTERNAL MEDICINE

## 2024-02-14 PROCEDURE — 37000008 HC ANESTHESIA 1ST 15 MINUTES: Performed by: INTERNAL MEDICINE

## 2024-02-14 PROCEDURE — 37000009 HC ANESTHESIA EA ADD 15 MINS: Performed by: INTERNAL MEDICINE

## 2024-02-14 PROCEDURE — 25000003 PHARM REV CODE 250: Performed by: NURSE ANESTHETIST, CERTIFIED REGISTERED

## 2024-02-14 PROCEDURE — D9220A PRA ANESTHESIA: Mod: PT,ANES,, | Performed by: STUDENT IN AN ORGANIZED HEALTH CARE EDUCATION/TRAINING PROGRAM

## 2024-02-14 RX ORDER — PROCHLORPERAZINE EDISYLATE 5 MG/ML
5 INJECTION INTRAMUSCULAR; INTRAVENOUS EVERY 30 MIN PRN
Status: DISCONTINUED | OUTPATIENT
Start: 2024-02-14 | End: 2024-02-14 | Stop reason: HOSPADM

## 2024-02-14 RX ORDER — PROPOFOL 10 MG/ML
VIAL (ML) INTRAVENOUS
Status: DISCONTINUED | OUTPATIENT
Start: 2024-02-14 | End: 2024-02-14

## 2024-02-14 RX ORDER — LIDOCAINE HYDROCHLORIDE 20 MG/ML
INJECTION INTRAVENOUS
Status: DISCONTINUED | OUTPATIENT
Start: 2024-02-14 | End: 2024-02-14

## 2024-02-14 RX ORDER — SODIUM CHLORIDE 0.9 % (FLUSH) 0.9 %
10 SYRINGE (ML) INJECTION
Status: DISCONTINUED | OUTPATIENT
Start: 2024-02-14 | End: 2024-02-14 | Stop reason: HOSPADM

## 2024-02-14 RX ORDER — PROPOFOL 10 MG/ML
VIAL (ML) INTRAVENOUS CONTINUOUS PRN
Status: DISCONTINUED | OUTPATIENT
Start: 2024-02-14 | End: 2024-02-14

## 2024-02-14 RX ORDER — SODIUM CHLORIDE 9 MG/ML
INJECTION, SOLUTION INTRAVENOUS CONTINUOUS
Status: DISCONTINUED | OUTPATIENT
Start: 2024-02-14 | End: 2024-02-14 | Stop reason: HOSPADM

## 2024-02-14 RX ADMIN — GLYCOPYRROLATE 0.1 MG: 0.2 INJECTION, SOLUTION INTRAMUSCULAR; INTRAVITREAL at 08:02

## 2024-02-14 RX ADMIN — PROPOFOL 100 MCG/KG/MIN: 10 INJECTION, EMULSION INTRAVENOUS at 08:02

## 2024-02-14 RX ADMIN — SODIUM CHLORIDE: 9 INJECTION, SOLUTION INTRAVENOUS at 07:02

## 2024-02-14 RX ADMIN — PROPOFOL 50 MG: 10 INJECTION, EMULSION INTRAVENOUS at 08:02

## 2024-02-14 RX ADMIN — LIDOCAINE HYDROCHLORIDE 40 MG: 20 INJECTION, SOLUTION INTRAVENOUS at 08:02

## 2024-02-14 NOTE — ANESTHESIA PREPROCEDURE EVALUATION
"                                                                                                             [facility]  Anesthesia Pre-Operative Evaluation         Patient Name: Quan Gurrola  YOB: 1955  MRN: [id number]    SUBJECTIVE:     Pre-operative evaluation for Procedure(s) (LRB):  COLONOSCOPY (N/A)     2024    Quan Gurrola is a 68 y.o. male w/ a significant PMHx as below who presents for the above procedure.    COPD, HLD, JHAVERI. No TTE on file. Solitary pulm nodule, following with pulmonology    Moderate obstruction on spirometry    LDA: None documented.    Prev airway: None documented.     Drips: None documented.    Patient Active Problem List   Diagnosis    Chronic pain of both shoulders    Neuropathy    Shoulder weakness    Solitary pulmonary nodule       Review of patient's allergies indicates:  No Known Allergies    Current Inpatient Medications:      No current facility-administered medications on file prior to encounter.     Current Outpatient Medications on File Prior to Encounter   Medication Sig Dispense Refill    albuterol (PROVENTIL/VENTOLIN HFA) 90 mcg/actuation inhaler SMARTSI-2 Puff(s) By Mouth Every 6 Hours PRN      ANORO ELLIPTA 62.5-25 mcg/actuation DsDv Inhale 1 puff into the lungs.      aspirin 81 MG Chew Take 81 mg by mouth once daily.      lactulose (CHRONULAC) 10 gram/15 mL solution Take by mouth.      lovastatin (MEVACOR) 10 MG tablet Take 10 mg by mouth.         No past surgical history on file.    Social History     Socioeconomic History    Marital status: Single   Tobacco Use    Smoking status: Every Day     Current packs/day: 0.50     Types: Cigarettes   Substance and Sexual Activity    Alcohol use: No       OBJECTIVE:     Vital Signs Range (Last 24H):         CBC:   No results for input(s): "WBC", "RBC", "HGB", "HCT", "PLT", "MCV", "MCH", "MCHC" in the last 72 hours.    CMP: No results for input(s): "NA", "K", "CL", "CO2", "BUN", "CREATININE", " ""GLU", "MG", "PHOS", "CALCIUM", "ALBUMIN", "PROT", "ALKPHOS", "ALT", "AST", "BILITOT" in the last 72 hours.    INR:  No results for input(s): "PT", "INR", "PROTIME", "APTT" in the last 72 hours.    Diagnostic Studies: No relevant studies.    EKG: No recent studies available.    2D ECHO:  No results found for this or any previous visit.      ASSESSMENT/PLAN:           Pre-op Assessment    I have reviewed the Patient Summary Reports.    I have reviewed the NPO Status.   I have reviewed the Medications.     Review of Systems  Anesthesia Hx:  No problems with previous Anesthesia                Cardiovascular:                hyperlipidemia                             Pulmonary:   COPD   Shortness of breath   Solitary pulm nodule                   Physical Exam  General: Alert and Cooperative    Airway:  Mallampati: III / II  Mouth Opening: Normal  TM Distance: Normal  Tongue: Normal  Neck ROM: Normal ROM    Chest/Lungs:  Normal Respiratory Rate    Heart:  Rate: Normal        Anesthesia Plan  Type of Anesthesia, risks & benefits discussed:    Anesthesia Type: Gen Natural Airway  Intra-op Monitoring Plan: Standard ASA Monitors  Post Op Pain Control Plan: multimodal analgesia  Induction:  IV  Airway Plan: Direct, Post-Induction  Informed Consent: Informed consent signed with the Patient and all parties understand the risks and agree with anesthesia plan.  All questions answered.   ASA Score: 3  Day of Surgery Review of History & Physical: H&P Update referred to the surgeon/provider.    Ready For Surgery From Anesthesia Perspective.     .      "

## 2024-02-14 NOTE — ANESTHESIA POSTPROCEDURE EVALUATION
Anesthesia Post Evaluation    Patient: Quan Gurrola    Procedure(s) Performed: Procedure(s) (LRB):  COLONOSCOPY (N/A)    Final Anesthesia Type: general      Patient location during evaluation: PACU  Patient participation: Yes- Able to Participate  Level of consciousness: awake and alert  Post-procedure vital signs: reviewed and stable  Pain management: adequate  Airway patency: patent  CATE mitigation strategies: Multimodal analgesia  PONV status at discharge: No PONV  Anesthetic complications: no      Cardiovascular status: hemodynamically stable  Respiratory status: spontaneous ventilation and room air  Hydration status: euvolemic  Follow-up not needed.              Vitals Value Taken Time   /64 02/14/24 0905   Temp 36.8 °C (98.2 °F) 02/14/24 0833   Pulse 89 02/14/24 0905   Resp 22 02/14/24 0905   SpO2 99 % 02/14/24 0905         Event Time   Out of Recovery 09:07:05         Pain/Arden Score: Arden Score: 10 (2/14/2024  8:51 AM)          
Anemia

## 2024-02-14 NOTE — TRANSFER OF CARE
"Anesthesia Transfer of Care Note    Patient: Quan Gurrola    Procedure(s) Performed: Procedure(s) (LRB):  COLONOSCOPY (N/A)    Patient location: GI    Anesthesia Type: general    Transport from OR: Transported from OR on room air with adequate spontaneous ventilation    Post pain: adequate analgesia    Post assessment: no apparent anesthetic complications and tolerated procedure well    Post vital signs: stable    Level of consciousness: responds to stimulation and sedated    Nausea/Vomiting: no nausea/vomiting    Complications: none    Transfer of care protocol was followed      Last vitals: Visit Vitals  BP 91/63   Pulse 77   Temp 36.8 °C (98.2 °F)   Resp 20   Ht 5' 4" (1.626 m)   Wt 49.4 kg (109 lb)   SpO2 100%   BMI 18.71 kg/m²     "

## 2024-02-14 NOTE — PR[employer]ATION PATIENT INSTRUCTIONS
Discharge Summary/Instructions after an Endoscopic Procedure  Patient Name: Quan Gurrola  Patient MRN: [id number]  Patient YOB: 1955 Wednesday, February 14, 2024  Edward Cuenca MD  Dear patient,  As a result of recent federal legislation (The Federal Cures Act), you may   receive lab or pathology results from your procedure in your [facility]   account before your physician is able to contact you. Your physician or   their representative will relay the results to you with their   recommendations at their soonest availability.  Thank you,  Your health is very important to us during the Covid Crisis. Following your   procedure today, you will receive a daily text for 2 weeks asking about   signs or symptoms of Covid 19.  Please respond to this text when you   receive it so we can follow up and keep you as safe as possible.   RESTRICTIONS:  During your procedure today, you received medications for sedation.  These   medications may affect your judgment, balance and coordination.  Therefore,   for 24 hours, you have the following restrictions:   - DO NOT drive a car, operate machinery, make legal/financial decisions,   sign important papers or drink alcohol.    ACTIVITY:  Today: no heavy lifting, straining or running due to procedural   sedation/anesthesia.  The following day: return to full activity including work.  DIET:  Eat and drink normally unless instructed otherwise.     TREATMENT FOR COMMON SIDE EFFECTS:  - Mild abdominal pain, nausea, belching, bloating or excessive gas:  rest,   eat lightly and use a heating pad.  - Sore Throat: treat with throat lozenges and/or gargle with warm salt   water.  - Because air was used during the procedure, expelling large amounts of air   from your rectum or belching is normal.  - If a bowel prep was taken, you may not have a bowel movement for 1-3 days.    This is normal.  SYMPTOMS TO WATCH FOR AND REPORT TO YOUR PHYSICIAN:  1. Abdominal pain or  bloating, other than gas cramps.  2. Chest pain.  3. Back pain.  4. Signs of infection such as: chills or fever occurring within 24 hours   after the procedure.  5. Rectal bleeding, which would show as bright red, maroon, or black stools.   (A tablespoon of blood from the rectum is not serious, especially if   hemorrhoids are present.)  6. Vomiting.  7. Weakness or dizziness.  GO DIRECTLY TO THE NEAREST EMERGENCY ROOM IF YOU HAVE ANY OF THE FOLLOWING:      Difficulty breathing              Chills and/or fever over 101 F   Persistent vomiting and/or vomiting blood   Severe abdominal pain   Severe chest pain   Black, tarry stools   Bleeding- more than one tablespoon   Any other symptom or condition that you feel may need urgent attention  Your doctor recommends these additional instructions:  If any biopsies were taken, your doctors clinic will contact you in 1 to 2   weeks with any results.  - Discharge patient to home.   - Resume previous diet.   - Continue present medications.   - Await pathology results.   - Repeat colonoscopy in 6 months because the bowel preparation was   suboptimal.   - Patient has a contact number available for emergencies.  The signs and   symptoms of potential delayed complications were discussed with the   patient.  Return to normal activities tomorrow.  Written discharge   instructions were provided to the patient.  For questions, problems or results please call your physician - Edward Cuenca MD.  EMERGENCY PHONE NUMBER: [phone],  LAB RESULTS: [phone]  IF A COMPLICATION OR EMERGENCY SITUATION ARISES AND YOU ARE UNABLE TO REACH   YOUR PHYSICIAN - GO DIRECTLY TO THE EMERGENCY ROOM.  Edward Cuenca MD  2/14/2024 8:30:05 AM  This report has been verified and signed electronically.  Dear patient,  As a result of recent federal legislation (The Federal Cures Act), you may   receive lab or pathology results from your procedure in your [facility]   account  before your physician is able to contact you. Your physician or   their representative will relay the results to you with their   recommendations at their soonest availability.  Thank you,  [employer]ATION

## 2024-02-25 LAB
FINAL PATHOLOGIC DIAGNOSIS: NORMAL
GROSS: NORMAL
Lab: NORMAL

## 2024-02-28 ENCOUNTER — TELEPHONE (OUTPATIENT)
Dept: GASTROENTEROLOGY | Facility: CLINIC | Age: 69
End: 2024-02-28
Payer: MEDICARE

## 2024-02-28 NOTE — TELEPHONE ENCOUNTER
----- Message from Edward Cuenca MD sent at 2/26/2024 12:58 PM CST -----  Pathology from recent colonoscopy reviewed.  Colon polyp(s) benign.  Repeat colonoscopy in 6 months d/t inadequate prep.

## 2024-03-07 ENCOUNTER — TELEPHONE (OUTPATIENT)
Dept: GASTROENTEROLOGY | Facility: CLINIC | Age: 69
End: 2024-03-07
Payer: MEDICARE

## 2024-03-07 NOTE — TELEPHONE ENCOUNTER
Left VM for patient to call the office back to discuss test results.      ----- Message from Edward Cuenca MD sent at 2/26/2024 12:58 PM CST -----  Pathology from recent colonoscopy reviewed.  Colon polyp(s) benign.  Repeat colonoscopy in 6 months d/t inadequate prep.

## 2024-03-21 ENCOUNTER — TELEPHONE (OUTPATIENT)
Dept: GASTROENTEROLOGY | Facility: CLINIC | Age: 69
End: 2024-03-21
Payer: MEDICARE

## 2024-03-21 NOTE — TELEPHONE ENCOUNTER
Left VM for patient to call the office back. Letter sent.     ----- Message from Edward Cuenca MD sent at 2/26/2024 12:58 PM CST -----  Pathology from recent colonoscopy reviewed.  Colon polyp(s) benign.  Repeat colonoscopy in 6 months d/t inadequate prep.

## 2024-03-26 ENCOUNTER — TELEPHONE (OUTPATIENT)
Dept: GASTROENTEROLOGY | Facility: CLINIC | Age: 69
End: 2024-03-26
Payer: MEDICARE

## 2024-03-26 ENCOUNTER — TELEPHONE (OUTPATIENT)
Dept: PULMONOLOGY | Facility: CLINIC | Age: 69
End: 2024-03-26
Payer: MEDICARE

## 2024-03-26 NOTE — TELEPHONE ENCOUNTER
Spoke with pt sister (Ms Jovani Dyer), informed her that I can schedule pt appointment with Dr Navas on 4/24/24 for 2:00 pm. Pt sister verbalized that she understand and accepted appointment.

## 2024-03-26 NOTE — TELEPHONE ENCOUNTER
Patient sister was told that pt is not due until Aug for a 6 month repeat procedure. Verbal understanding.     ----- Message from Sidney Zuleta sent at 3/26/2024  1:03 PM CDT -----  Contact: Pt  .Type:  Needs Medical Advice    Who Called: pt. Sister Jovani Dyer  Symptoms (please be specific):  Polyps    Would the patient rather a call back or a response via [facility]?  Call back  Best Call Back Number: [phone]  Additional Information: Pt was told to reschedule a colonoscopy in 6 months for polyps.

## 2024-03-26 NOTE — TELEPHONE ENCOUNTER
----- Message from Nishant Galvez sent at 3/26/2024  1:40 PM CDT -----  Contact: pt wife  Type:  Sooner Apoointment Request    Caller is requesting a sooner appointment.  Caller declined first available appointment listed below.  Caller will not accept being placed on the waitlist and is requesting a message be sent to doctor.  Name of Caller: Jovani Natalia   When is the first available appointment? N/a   Symptoms: lung mass  Would the patient rather a call back or a response via [facility]? call  Best Call Back Number:[phone]  Additional Information:

## 2024-03-28 ENCOUNTER — TELEPHONE (OUTPATIENT)
Dept: GASTROENTEROLOGY | Facility: CLINIC | Age: 69
End: 2024-03-28
Payer: MEDICARE

## 2024-03-28 NOTE — TELEPHONE ENCOUNTER
Patient sister was told that pt is not due until Aug for a 6 month repeat procedure. Verbal understanding.     ----- Message from Bessy Barraza sent at 3/28/2024  3:07 PM CDT -----  Type:  Test Results    Who Called: pt sister   Name of Test (Lab/Mammo/Etc): Colonoscopy   Date of Test: 02/14  Ordering Provider: Bang  Where the test was performed: [facility]   Would the patient rather a call back or a response via [facility]? Call   Best Call Back Number:   Additional Information:

## 2024-05-28 ENCOUNTER — TELEPHONE (OUTPATIENT)
Dept: PULMONOLOGY | Facility: CLINIC | Age: 69
End: 2024-05-28
Payer: MEDICARE

## 2024-05-31 ENCOUNTER — TELEPHONE (OUTPATIENT)
Dept: PULMONOLOGY | Facility: CLINIC | Age: 69
End: 2024-05-31

## 2024-05-31 ENCOUNTER — OFFICE VISIT (OUTPATIENT)
Dept: PULMONOLOGY | Facility: CLINIC | Age: 69
End: 2024-05-31
Payer: MEDICARE

## 2024-05-31 VITALS
DIASTOLIC BLOOD PRESSURE: 66 MMHG | HEART RATE: 85 BPM | HEIGHT: 64 IN | OXYGEN SATURATION: 94 % | SYSTOLIC BLOOD PRESSURE: 108 MMHG | BODY MASS INDEX: 18.75 KG/M2 | WEIGHT: 109.81 LBS

## 2024-05-31 DIAGNOSIS — R91.1 SOLITARY PULMONARY NODULE: Primary | ICD-10-CM

## 2024-05-31 DIAGNOSIS — J43.2 CENTRILOBULAR EMPHYSEMA: ICD-10-CM

## 2024-05-31 PROCEDURE — 3074F SYST BP LT 130 MM HG: CPT | Mod: CPTII,S$GLB,, | Performed by: INTERNAL MEDICINE

## 2024-05-31 PROCEDURE — 3288F FALL RISK ASSESSMENT DOCD: CPT | Mod: CPTII,S$GLB,, | Performed by: INTERNAL MEDICINE

## 2024-05-31 PROCEDURE — 3008F BODY MASS INDEX DOCD: CPT | Mod: CPTII,S$GLB,, | Performed by: INTERNAL MEDICINE

## 2024-05-31 PROCEDURE — 99999 PR PBB SHADOW E&M-EST. PATIENT-LVL III: CPT | Mod: PBBFAC,,, | Performed by: INTERNAL MEDICINE

## 2024-05-31 PROCEDURE — 1101F PT FALLS ASSESS-DOCD LE1/YR: CPT | Mod: CPTII,S$GLB,, | Performed by: INTERNAL MEDICINE

## 2024-05-31 PROCEDURE — 99214 OFFICE O/P EST MOD 30 MIN: CPT | Mod: S$GLB,,, | Performed by: INTERNAL MEDICINE

## 2024-05-31 PROCEDURE — 3078F DIAST BP <80 MM HG: CPT | Mod: CPTII,S$GLB,, | Performed by: INTERNAL MEDICINE

## 2024-05-31 PROCEDURE — 1159F MED LIST DOCD IN RCRD: CPT | Mod: CPTII,S$GLB,, | Performed by: INTERNAL MEDICINE

## 2024-05-31 NOTE — TELEPHONE ENCOUNTER
----- Message from Darrel Campbell MD sent at 5/31/2024 12:09 PM CDT -----  Regarding: Schedule with Eloise  Saw this patient today. Needs a follow up with Dr. Navas.     Thanks,   Darrel

## 2024-05-31 NOTE — PROGRESS NOTES
Subjective:      Patient ID: Quan Gurrola is a 69 y.o. male.    Chief Complaint: Pulmonary Nodules    Pt is a 68 yo AAM pmh previous tobacco use disorder, COPD, pulmonary nodule. Was supposed to have EBUS/Robotic bronchoscopy, but missed procedure 10/27. Many attempts made for follow up with Dr. Navas unsuccessful. Presented today for follow up pulmonary nodule. Continues to have weight loss and poor appetite. No hemoptysis or recurrent pneumonia.     Review of Systems   Constitutional:  Positive for weight loss. Negative for fever, activity change and fatigue.   HENT:  Negative for postnasal drip and congestion.    Respiratory:  Positive for dyspnea on extertion. Negative for cough, hemoptysis, sputum production, chest tightness, shortness of breath and wheezing.    Cardiovascular:  Negative for chest pain, palpitations and leg swelling.   Gastrointestinal:  Negative for nausea and vomiting.   Neurological:  Negative for dizziness and light-headedness.   Psychiatric/Behavioral:  Negative for confusion and sleep disturbance. The patient is not nervous/anxious.      Objective:     Physical Exam   Constitutional: He is oriented to person, place, and time. He appears well-developed. He appears cachectic.   HENT:   Head: Normocephalic.   Left mandibular subcutaneous nodule.    Cardiovascular: Normal rate, regular rhythm and normal heart sounds.   Pulmonary/Chest: Hyperinflation, symmetric chest wall expansion and effort normal. No respiratory distress. He has decreased breath sounds. He has no wheezes. He has no rhonchi. He has no rales.   Neurological: He is alert and oriented to person, place, and time. Gait normal.   Skin: No cyanosis. Nails show no clubbing.   Psychiatric: He has a normal mood and affect. His behavior is normal. Judgment and thought content normal.   Vitals reviewed.    Personal Diagnostic Review    CT of chest performed on 10/25/23 without contrast revealed RUL large pulmonary nodule.  "Bilateral upper lobe predominant emphysema.     Echocardiogram: none    Pulmonary function tests:   24  FEV1: 1.47L  (58.9 % predicted),   FVC:  2.60L (80.1 % predicted),   FEV1/FVC:  57,   T.99L (76.6 % predicted),   DLCO: 8.41 (33.9 % predicted)    Test Results:     The test was completed without stopping. The total time walked was 360 seconds. During walking, the patient reported:  No complaints. The patient used no assistive devices during testing.      10/25/2023---------Distance: 396.24 meters (1300 feet)       O2 Sat % Supplemental Oxygen Heart Rate Blood Pressure Spring Scale   Pre-exercise  (Resting) 94 % Room Air 97 bpm 105/70 mmHg 1   During Exercise 90 % Room Air 112 bpm 133/79 mmHg 2   Post-exercise  (Recovery) 94 % Room Air  96 bpm 118/66 mmHg        Recovery Time: 128 seconds     Performing nurse/tech: Evelia RRT     PREVIOUS STUDY:   The patient has not had a previous study.     CLINICAL INTERPRETATION:  Six minute walk distance is 396.24 meters (1300 feet) with light dyspnea.  During exercise, there was desaturation while breathing room air.  Both blood pressure and heart rate remained stable with walking.  The patient did not report non-pulmonary symptoms during exercise.  No previous study performed.  Based upon age and body mass index, exercise capacity is less than predicted.        2024    11:54 AM 2024     9:05 AM 2024     8:50 AM 2024     8:33 AM 2024     7:38 AM 2024     1:12 PM 10/25/2023    11:00 AM   Pulmonary Function Tests   SpO2 94 % 99 % 98 % 100 % 100 %     Ordering Provider       MD Eloise   Performing nurse/tech/RT       Evelia RRT   Diagnosis       Shortness of Breath   Height 5' 4" (1.626 m)    5' 4" (1.626 m) 5' 6" (1.676 m) 5' 6" (1.676 m)   Weight 49.8 kg (109 lb 12.6 oz)    49.4 kg (109 lb) 51.7 kg (113 lb 15.7 oz) 51.8 kg (114 lb 3.2 oz)   BMI (Calculated) 18.8    18.7 18.4 18.4   Patient Race          6MWT Status      "  completed without stopping   Patient Reported       No complaints   Was O2 used?       No   6MW Distance walked (feet)       1300 feet   Distance walked (meters)       396.24 meters   Did patient stop?       No   Type of assistive device(s) used?       no assistive devices   Oxygen Saturation       94 %   Supplemental Oxygen       Room Air   Heart Rate       97 bpm   Blood Pressure       105/70   Spring Dyspnea Rating        very light   Oxygen Saturation       90 %   Supplemental Oxygen       Room Air   Heart Rate       112 bpm   Blood Pressure       133/79   Spring Dyspnea Rating        light   Recovery Time (seconds)       128 seconds   Supplemental Oxygen       Room Air   Heart Rate       96 bpm   Blood Pressure       118/66   Is procedure ready for interpretation?       Yes   Oxygen Qualification?       No        Assessment:     1. Solitary pulmonary nodule    2. Centrilobular emphysema      Outpatient Encounter Medications as of 2024   Medication Sig Dispense Refill    albuterol (PROVENTIL/VENTOLIN HFA) 90 mcg/actuation inhaler SMARTSI-2 Puff(s) By Mouth Every 6 Hours PRN      ANORO ELLIPTA 62.5-25 mcg/actuation DsDv Inhale 1 puff into the lungs.      aspirin 81 MG Chew Take 81 mg by mouth once daily.      lactulose (CHRONULAC) 10 gram/15 mL solution Take by mouth.      lovastatin (MEVACOR) 10 MG tablet Take 10 mg by mouth.       No facility-administered encounter medications on file as of 2024.     Orders Placed This Encounter   Procedures    CT Chest High Resolution Without Contrast     Standing Status:   Future     Standing Expiration Date:   2025     Order Specific Question:   May the Radiologist modify the order per protocol to meet the clinical needs of the patient?     Answer:   Yes     Plan:     Solitary pulmonary nodule  Repeat HRCT and follow up with Dr. Navas in pulmonary clinic    Centrilobular emphysema  Well controlled on anoro ellipta. Rarely needs use of rescue inhaler. Will  continue.     Follow up with Dr. Navas.     Darrel Campbell MD  [facility]

## 2024-06-25 ENCOUNTER — TELEPHONE (OUTPATIENT)
Dept: PULMONOLOGY | Facility: CLINIC | Age: 69
End: 2024-06-25
Payer: MEDICARE

## 2024-06-25 NOTE — TELEPHONE ENCOUNTER
Ct Scan scheduled for 12:45pm at imaging center prior to appointment with Dr Navas at 2pm on 7/30/24. Appointment mailed.

## 2024-06-25 NOTE — TELEPHONE ENCOUNTER
----- Message from Mary Beth Tobin sent at 6/25/2024 11:24 AM CDT -----  Regarding: Order/Appointment  Contact: Max Dyer/sister [phone]  Calling to get orders for imaging for CT to be placed in Epic for Scheduling and schedule prior to appointment for 7/31/24 in the morning. Please call to confirm orders are in system

## 2024-11-29 DIAGNOSIS — R91.1 LESION OF RIGHT LUNG: Primary | ICD-10-CM

## 2024-12-04 ENCOUNTER — HOSPITAL ENCOUNTER (OUTPATIENT)
Dept: RADIOLOGY | Facility: HOSPITAL | Age: 69
Discharge: HOME | End: 2024-12-04
Attending: NURSE PRACTITIONER
Payer: MEDICARE

## 2024-12-04 DIAGNOSIS — R91.1 LESION OF RIGHT LUNG: ICD-10-CM

## 2024-12-04 PROCEDURE — 71250 CT THORAX DX C-: CPT | Mod: 26,,, | Performed by: RADIOLOGY

## 2024-12-04 PROCEDURE — 71250 CT THORAX DX C-: CPT | Mod: TC

## 2025-02-07 DIAGNOSIS — E46 PROTEIN CALORIE MALNUTRITION: Primary | ICD-10-CM

## 2025-02-07 DIAGNOSIS — E46 PROTEIN-CALORIE MALNUTRITION: Primary | ICD-10-CM

## 2025-03-05 ENCOUNTER — TELEPHONE (OUTPATIENT)
Dept: DERMATOLOGY | Facility: CLINIC | Age: 70
End: 2025-03-05
Payer: MEDICARE

## 2025-04-06 NOTE — PROGRESS NOTES
"[facility] [address]     SUBJECTIVE:     History of Present Illness:  Patient is a 70 y.o. male presents with abnormal echocardiogram showing Pulmonary hypertension. .     Primary Diagnosis:   Hypertension: none  DM: none  Smoker: quit one year ago   Family history of early CAD: none   Heart disease ; none   ROS  No chest pains  No shortness of breath with inhaler; no PND or orthopnea  No syncope  No palpitations  Activity: cooking, washing; walks in the grocery store without issues  Review of patient's allergies indicates:  No Known Allergies    Past Medical History:   Diagnosis Date    Mixed hyperlipidemia        Past Surgical History:   Procedure Laterality Date    COLONOSCOPY N/A 2024    Procedure: COLONOSCOPY;  Surgeon: Edward Cuenca MD;  Location: [facility];  Service: Endoscopy;  Laterality: N/A;           Past Hospitalization:         Cardiac meds:    Inhalers  ASA 81 mg  Lovastatin 10 mg       OBJECTIVE:     Vital Signs (Most Recent)  Vitals:    25 0947   BP: 121/83   Pulse: 98   SpO2: 95%   Weight: 54.1 kg (119 lb 4.3 oz)   Height: 5' 4" (1.626 m)         Physical Exam:  Neck: normal carotids, no bruits; normal JVP  Lungs :clear  Heart: RR, normal S1,S2, soft systolic murmurs but no TR or MR murmurs no gallops  Abd: no masses; no bruits;   Exts: normal DP and PT pulses bilaterally, normal radials; no edema noted   Palpable aorta; no bruits             Labs  None     Diagnostic Results:    1.EK.Echo: : [address]: PAS 67;normal EF; moderate to severe TR, mild MR   3. Stress test  4. cath  5. CT head: : subdural hematoma after alcohol use   CT chest: : right upper lobe lesion: unchanged with PET showing cavitation   Chart review:        ASSESSMENT/PLAN:     Blood pressures normal  No angina or failure symptoms  No murmurs of TR or MR  Pulmonary hypetension: most likely from his COPD and long standing smoking history   5. No right sided failure signs   Plan: 1. No " further workup needed since he is asymptmatic  2.return as needed      Vivienne Vences MD

## 2025-04-07 ENCOUNTER — OFFICE VISIT (OUTPATIENT)
Dept: CARDIOLOGY | Facility: CLINIC | Age: 70
End: 2025-04-07
Payer: MEDICARE

## 2025-04-07 VITALS
HEIGHT: 64 IN | OXYGEN SATURATION: 95 % | BODY MASS INDEX: 20.36 KG/M2 | WEIGHT: 119.25 LBS | SYSTOLIC BLOOD PRESSURE: 121 MMHG | DIASTOLIC BLOOD PRESSURE: 83 MMHG | HEART RATE: 98 BPM

## 2025-04-07 DIAGNOSIS — I27.20 PULMONARY HYPERTENSION: Primary | ICD-10-CM

## 2025-04-07 PROCEDURE — 3079F DIAST BP 80-89 MM HG: CPT | Mod: CPTII,S$GLB,, | Performed by: INTERNAL MEDICINE

## 2025-04-07 PROCEDURE — 1159F MED LIST DOCD IN RCRD: CPT | Mod: CPTII,S$GLB,, | Performed by: INTERNAL MEDICINE

## 2025-04-07 PROCEDURE — 3288F FALL RISK ASSESSMENT DOCD: CPT | Mod: CPTII,S$GLB,, | Performed by: INTERNAL MEDICINE

## 2025-04-07 PROCEDURE — 1101F PT FALLS ASSESS-DOCD LE1/YR: CPT | Mod: CPTII,S$GLB,, | Performed by: INTERNAL MEDICINE

## 2025-04-07 PROCEDURE — 99204 OFFICE O/P NEW MOD 45 MIN: CPT | Mod: S$GLB,,, | Performed by: INTERNAL MEDICINE

## 2025-04-07 PROCEDURE — 3074F SYST BP LT 130 MM HG: CPT | Mod: CPTII,S$GLB,, | Performed by: INTERNAL MEDICINE

## 2025-04-07 PROCEDURE — 3008F BODY MASS INDEX DOCD: CPT | Mod: CPTII,S$GLB,, | Performed by: INTERNAL MEDICINE

## 2025-04-07 PROCEDURE — 99999 PR PBB SHADOW E&M-EST. PATIENT-LVL III: CPT | Mod: PBBFAC,,, | Performed by: INTERNAL MEDICINE

## 2025-04-07 PROCEDURE — 1126F AMNT PAIN NOTED NONE PRSNT: CPT | Mod: CPTII,S$GLB,, | Performed by: INTERNAL MEDICINE

## 2025-04-07 PROCEDURE — 1160F RVW MEDS BY RX/DR IN RCRD: CPT | Mod: CPTII,S$GLB,, | Performed by: INTERNAL MEDICINE

## 2025-07-24 DIAGNOSIS — R97.20 ELEVATED PSA: Primary | ICD-10-CM

## 2025-07-25 ENCOUNTER — OFFICE VISIT (OUTPATIENT)
Dept: UROLOGY | Facility: CLINIC | Age: 70
End: 2025-07-25
Payer: MEDICARE

## 2025-07-25 VITALS
DIASTOLIC BLOOD PRESSURE: 84 MMHG | HEART RATE: 94 BPM | SYSTOLIC BLOOD PRESSURE: 142 MMHG | WEIGHT: 118.06 LBS | BODY MASS INDEX: 20.16 KG/M2 | HEIGHT: 64 IN

## 2025-07-25 DIAGNOSIS — R97.20 ELEVATED PSA: ICD-10-CM

## 2025-07-25 PROCEDURE — 99999 PR PBB SHADOW E&M-EST. PATIENT-LVL III: CPT | Mod: PBBFAC,,,

## 2025-07-25 RX ORDER — MONTELUKAST SODIUM 10 MG/1
TABLET ORAL
COMMUNITY
Start: 2025-07-16

## 2025-07-25 RX ORDER — BUDESONIDE, GLYCOPYRROLATE, AND FORMOTEROL FUMARATE 160; 9; 4.8 UG/1; UG/1; UG/1
AEROSOL, METERED RESPIRATORY (INHALATION)
COMMUNITY
Start: 2025-07-16

## 2025-07-25 NOTE — PROGRESS NOTES
"Subjective:       Patient ID: Quan Gurrola is a 70 y.o. male.    Chief Complaint: Elevated PSA     This is a 70 y.o.  male patient that is new to me.  The patient was referred to me by Yesenia Marquez for elevated PSA.  Previously abnormal PSA: no. Previous biopsy: no. Previous abnormal TIANA: no.  Family history of prostate cancer: no.  Blood thinners:  no.  Denies LUTS, gross hematuria, dysuria. Endorses no other complaints today.     PSA updated in media:  3/25/25-- 5.1  7/10/25-- 5.3    LAST PSA  No results found for: "PSA", "PSADIAG", "PSATOTAL", "PSAFREE"    No results found for: "CREATININE"    ---  Past Medical History:   Diagnosis Date    Elevated PSA     Mixed hyperlipidemia        Past Surgical History:   Procedure Laterality Date    COLONOSCOPY N/A 2/14/2024    Procedure: COLONOSCOPY;  Surgeon: Edward Cuenca MD;  Location: [facility];  Service: Endoscopy;  Laterality: N/A;       No family history on file.    Social History[1]    Medications Ordered Prior to Encounter[2]    Review of patient's allergies indicates:  No Known Allergies    Review of Systems   Constitutional:  Negative for activity change, chills and fever.   Respiratory:  Negative for shortness of breath.    Cardiovascular:  Negative for chest pain and palpitations.   Gastrointestinal:  Negative for abdominal pain and constipation.   Genitourinary:  Negative for difficulty urinating, dysuria, flank pain, frequency, hematuria and urgency.   Neurological:  Negative for dizziness and light-headedness.     Objective:      Physical Exam  HENT:      Head: Normocephalic.   Pulmonary:      Effort: Pulmonary effort is normal.   Genitourinary:     Prostate: Enlarged. Not tender and no nodules present.      Comments: TIANA-- enlarged prostate, smooth and firm without nodules or lesions palpated.  Musculoskeletal:         General: Normal range of motion.      Cervical back: Normal range of motion.   Skin:     General: Skin is warm and dry. "   Neurological:      Mental Status: He is alert and oriented to person, place, and time.       Assessment:     Problem Noted   Elevated Psa 2025         Plan:     Discussed PSA levels including guidelines and ranges based on ethnicity and age. Based on patients age PSA is within normal limits.  Will watch PSA with repeat in 3 months  TIANA today in clinic, normal, see physical assessment  Follow-up 3 months to review    Discussion: The natural history of prostate cancer and ongoing controversy regarding screening was discussed with the patient. The potential treatment outcomes of prostate cancer were explained. The meaning of a false positive PSA and a false negative PSA has been discussed. He understands that PSA is a screening blood test. If elevated, it could be due to an enlarged prostate, inflammation of the prostate, infection of the prostate, or prostate cancer.            DOROTEO Chen     I spent a total of 25 minutes on the day of the visit.This includes face to face time and non-face to face time preparing to see the patient (eg, review of tests), obtaining and/or reviewing separately obtained history, documenting clinical information in the electronic or other health record, independently interpreting results and communicating results to the patient/family/caregiver, or care coordinator.          [1]   Social History  Tobacco Use    Smoking status: Former     Current packs/day: 0.00     Types: Cigarettes     Quit date: 2024     Years since quittin.1     Passive exposure: Past    Smokeless tobacco: Former   Vaping Use    Vaping status: Never Used   Substance Use Topics    Alcohol use: No    Drug use: Yes     Types: Marijuana   [2]   Current Outpatient Medications on File Prior to Visit   Medication Sig Dispense Refill    albuterol (PROVENTIL/VENTOLIN HFA) 90 mcg/actuation inhaler SMARTSI-2 Puff(s) By Mouth Every 6 Hours PRN      ANORO ELLIPTA 62.5-25 mcg/actuation DsDv Inhale 1 puff  into the lungs.      aspirin 81 MG Chew Take 81 mg by mouth once daily.      BREZTRI AEROSPHERE 160-9-4.8 mcg/actuation HFAA       lovastatin (MEVACOR) 10 MG tablet Take 10 mg by mouth.      montelukast (SINGULAIR) 10 mg tablet       lactulose (CHRONULAC) 10 gram/15 mL solution Take by mouth.       No current facility-administered medications on file prior to visit.

## 2025-08-01 ENCOUNTER — TELEPHONE (OUTPATIENT)
Dept: DERMATOLOGY | Facility: CLINIC | Age: 70
End: 2025-08-01
Payer: MEDICARE